# Patient Record
Sex: MALE | Race: OTHER | URBAN - METROPOLITAN AREA
[De-identification: names, ages, dates, MRNs, and addresses within clinical notes are randomized per-mention and may not be internally consistent; named-entity substitution may affect disease eponyms.]

---

## 2020-04-01 ENCOUNTER — EMERGENCY (EMERGENCY)
Facility: HOSPITAL | Age: 55
LOS: 1 days | Discharge: HOME | End: 2020-04-01
Attending: EMERGENCY MEDICINE | Admitting: EMERGENCY MEDICINE
Payer: SELF-PAY

## 2020-04-01 VITALS
SYSTOLIC BLOOD PRESSURE: 116 MMHG | HEART RATE: 99 BPM | TEMPERATURE: 98 F | DIASTOLIC BLOOD PRESSURE: 71 MMHG | RESPIRATION RATE: 18 BRPM | OXYGEN SATURATION: 96 %

## 2020-04-01 PROCEDURE — 99284 EMERGENCY DEPT VISIT MOD MDM: CPT

## 2020-04-01 PROCEDURE — 99283 EMERGENCY DEPT VISIT LOW MDM: CPT

## 2020-04-01 NOTE — ED PROVIDER NOTE - ATTENDING CONTRIBUTION TO CARE
54 m BIBA after being found sleeping on sidewalk bench.  He is alert and oriented x 3, says he smoked K2 and feels "fine".  Has no complaints.  Denies any medical history  Denies medications    Pt AAO x 3 with steady gait, able to be discharged

## 2020-04-01 NOTE — ED ADULT TRIAGE NOTE - CHIEF COMPLAINT QUOTE
Presents to ED by EMS for AMS.  Endorses smoking K2 this evening and states he was told to come in, "They say I don't look well."  Patient is A&O3.  Denies any CP, SOB, n/v, abd pain, fevers, chills.

## 2020-04-01 NOTE — ED PROVIDER NOTE - NS ED ROS FT
CONSTITUTIONAL:  no fever/chills  NEURO: no headache  EYE:  ENT: no throat pain  CV: no chest pain or palpitations  PULM: no cough or difficulty breathing  GI: no abdominal pain, vomiting, or diarrhea  :  SKIN:  MSK: no neck stiffness  IMMUN:  ENDOCRINE:

## 2020-04-01 NOTE — ED PROVIDER NOTE - PHYSICAL EXAMINATION
GENERAL:  awake, alert, nontoxic-appearance, NAD  HEAD:  NC/AT  EYE:  conj clear  ENT:  CV:   RRR  PULM:  CTA  GI:  :  SKIN: normal color and turgor  MSK:  NEURO:  alert and oriented to self, place, date.  speech clear.  ACEVEDO. gait steady.

## 2020-04-01 NOTE — ED PROVIDER NOTE - OBJECTIVE STATEMENT
54 m BIBA after being found sleeping on sidewalk bench.  He is alert and oriented x 3, says he smoked K2 and feels "fine".  Has no complaints. 54 m BIBA after being found sleeping on sidewalk bench.  He is alert and oriented x 3, says he smoked K2 and feels "fine".  Has no complaints.  Denies any medical history  Denies medications

## 2020-04-01 NOTE — ED PROVIDER NOTE - CLINICAL SUMMARY MEDICAL DECISION MAKING FREE TEXT BOX
K2 drug use. No complaints.  Normal VS.  Appears well.  alert and oriented.  speech clear. gait steady. K2 drug use. No complaints.  Normal VS.  Appears well.  No signs of trauma. alert and oriented.  speech clear. gait steady.

## 2020-04-01 NOTE — ED PROVIDER NOTE - PATIENT PORTAL LINK FT
You can access the FollowMyHealth Patient Portal offered by [facility] by registering at the following website: [email or web address]. By joining [facility]’s FollowMyHealth portal, you will also be able to view your health information using other applications (apps) compatible with our system.

## 2020-04-05 DIAGNOSIS — Z88.0 ALLERGY STATUS TO PENICILLIN: ICD-10-CM

## 2020-04-05 DIAGNOSIS — F19.10 OTHER PSYCHOACTIVE SUBSTANCE ABUSE, UNCOMPLICATED: ICD-10-CM

## 2020-04-05 DIAGNOSIS — F17.200 NICOTINE DEPENDENCE, UNSPECIFIED, UNCOMPLICATED: ICD-10-CM

## 2020-04-05 DIAGNOSIS — Z03.89 ENCOUNTER FOR OBSERVATION FOR OTHER SUSPECTED DISEASES AND CONDITIONS RULED OUT: ICD-10-CM

## 2024-09-17 NOTE — ED PROVIDER NOTE - TOBACCO USE
Detail Level: Detailed Quality 47: Advance Care Plan: Advance Care Planning discussed and documented in the medical record; patient did not wish or was not able to name a surrogate decision maker or provide an advance care plan. Quality 226: Preventive Care And Screening: Tobacco Use: Screening And Cessation Intervention: Patient screened for tobacco use and is an ex/non-smoker Current every day smoker